# Patient Record
(demographics unavailable — no encounter records)

---

## 2025-02-12 NOTE — HISTORY OF PRESENT ILLNESS
[Nasal congestion] : nasal congestion [___ Day(s)] : [unfilled] day(s) [Intermittent] : intermittent [Fatigued] : fatigued [Sick Contacts: ___] : sick contacts: [unfilled] [Mucoid discharge] : mucoid discharge [At Night] : at night [Humidifier] : humidifier [Rhinorrhea] : rhinorrhea [Nasal Congestion] : nasal congestion [Cough] : cough [EENT/Resp Symptoms] : EENT/RESPIRATORY SYMPTOMS [Known Exposure to COVID-19] : no known exposure to COVID-19 [Hx of recent COVID-19 infection] : no history of recent COVID-19 infection [Headache] : no headache [Eye Redness] : no eye redness [Eye Discharge] : no eye discharge [Eye Itching] : no eye itching [Ear Pain] : no ear pain [Sore Throat] : no sore throat [Wheezing] : no wheezing [Shortness of Breath] : no shortness of breath [Decreased Appetite] : no decreased appetite [Vomiting] : no vomiting [Diarrhea] : no diarrhea [Decreased Urine Output] : no decreased urine output [Rash] : no rash [FreeTextEntry9] : thick mucous and sinus tenderness [FreeTextEntry4] : Zyrtec [FreeTextEntry5] : cough at night [de-identified] : He had the flu about 1 month ago and he was tested at a [facility] [place]

## 2025-02-12 NOTE — HISTORY OF PRESENT ILLNESS
[Nasal congestion] : nasal congestion [___ Day(s)] : [unfilled] day(s) [Intermittent] : intermittent [Fatigued] : fatigued [Sick Contacts: ___] : sick contacts: [unfilled] [Mucoid discharge] : mucoid discharge [At Night] : at night [Humidifier] : humidifier [Rhinorrhea] : rhinorrhea [Nasal Congestion] : nasal congestion [Cough] : cough [EENT/Resp Symptoms] : EENT/RESPIRATORY SYMPTOMS [Known Exposure to COVID-19] : no known exposure to COVID-19 [Hx of recent COVID-19 infection] : no history of recent COVID-19 infection [Headache] : no headache [Eye Redness] : no eye redness [Eye Discharge] : no eye discharge [Eye Itching] : no eye itching [Ear Pain] : no ear pain [Sore Throat] : no sore throat [Wheezing] : no wheezing [Shortness of Breath] : no shortness of breath [Decreased Appetite] : no decreased appetite [Vomiting] : no vomiting [Diarrhea] : no diarrhea [Decreased Urine Output] : no decreased urine output [Rash] : no rash [FreeTextEntry9] : thick mucous and sinus tenderness [FreeTextEntry4] : Zyrtec [FreeTextEntry5] : cough at night [de-identified] : He had the flu about 1 month ago and he was tested at a [facility] [place]

## 2025-02-12 NOTE — DISCUSSION/SUMMARY
[FreeTextEntry1] : 1) Viral syndrome- Viral syndrome suspected. Patient looks very well today. Continue fever control. No signs of meningitis or dehydration today. Monitor for any worsening symptoms and return to be seen if fever lasts more than 5 days or accompanied by concerning symptoms/signs as discussed. 2) Sinusitis- Recommend antibiotics, nasal saline, and mucinex. Return if symptoms worsen or persist. CEFDINIR STARTED

## 2025-05-08 NOTE — DISCUSSION/SUMMARY
[FreeTextEntry1] : This is an autistic young man who presents with walking differently since yesterday seems to be doing better today.  Patient also noted to have red rash on scrotum urinating fine Physical exam shows superficial rash testes are both descended he does not like to be examined however did not appear to be in pain with examination of the testicles. Possible monilial rash apply nystatin cream 3-4 times a day x 1 week May try Lotrimin powder Continue with observation at this time Urine analysis was within normal limits. Perilesional Excision Additional Text (Leave Blank If You Do Not Want): The margin was drawn around the clinically apparent lesion. Incisions were then made along these lines to the appropriate tissue plane and the lesion was extirpated.

## 2025-05-08 NOTE — PHYSICAL EXAM
[NL] : moves all extremities x4, warm, well perfused x4 [FreeTextEntry6] : Testes are descended bilaterally patient has hidden penis area of the scrotum superficial skin appears to be red normal palpation he is somewhat difficult to examine but does not appear to be in any discomfort [de-identified] : Flat red rash on scrotum